# Patient Record
Sex: MALE | Race: WHITE
[De-identification: names, ages, dates, MRNs, and addresses within clinical notes are randomized per-mention and may not be internally consistent; named-entity substitution may affect disease eponyms.]

---

## 2018-05-29 ENCOUNTER — HOSPITAL ENCOUNTER (INPATIENT)
Dept: HOSPITAL 93 - ER | Age: 83
LOS: 3 days | Discharge: HOME | DRG: 684 | End: 2018-06-01
Attending: INTERNAL MEDICINE | Admitting: INTERNAL MEDICINE
Payer: COMMERCIAL

## 2018-05-29 VITALS — BODY MASS INDEX: 29.07 KG/M2 | HEIGHT: 61 IN | WEIGHT: 154 LBS

## 2018-05-29 DIAGNOSIS — I70.292: ICD-10-CM

## 2018-05-29 DIAGNOSIS — R97.20: ICD-10-CM

## 2018-05-29 DIAGNOSIS — J22: ICD-10-CM

## 2018-05-29 DIAGNOSIS — I13.10: ICD-10-CM

## 2018-05-29 DIAGNOSIS — E11.3293: ICD-10-CM

## 2018-05-29 DIAGNOSIS — E11.42: ICD-10-CM

## 2018-05-29 DIAGNOSIS — N18.1: ICD-10-CM

## 2018-05-29 DIAGNOSIS — E11.65: ICD-10-CM

## 2018-05-29 DIAGNOSIS — E11.22: ICD-10-CM

## 2018-05-29 DIAGNOSIS — E86.0: ICD-10-CM

## 2018-05-29 DIAGNOSIS — E78.4: ICD-10-CM

## 2018-05-29 DIAGNOSIS — N17.8: Primary | ICD-10-CM

## 2018-05-30 PROCEDURE — BW40ZZZ ULTRASONOGRAPHY OF ABDOMEN: ICD-10-PCS | Performed by: INTERNAL MEDICINE

## 2018-05-30 PROCEDURE — 3E0F7GC INTRODUCTION OF OTHER THERAPEUTIC SUBSTANCE INTO RESPIRATORY TRACT, VIA NATURAL OR ARTIFICIAL OPENING: ICD-10-PCS | Performed by: INTERNAL MEDICINE

## 2018-06-07 ENCOUNTER — HOSPITAL ENCOUNTER (INPATIENT)
Dept: HOSPITAL 93 - ER | Age: 83
LOS: 4 days | Discharge: HOME | DRG: 378 | End: 2018-06-11
Attending: INTERNAL MEDICINE | Admitting: INTERNAL MEDICINE
Payer: COMMERCIAL

## 2018-06-07 VITALS — BODY MASS INDEX: 29.44 KG/M2 | WEIGHT: 160 LBS | HEIGHT: 62 IN

## 2018-06-07 DIAGNOSIS — E11.3293: ICD-10-CM

## 2018-06-07 DIAGNOSIS — E11.42: ICD-10-CM

## 2018-06-07 DIAGNOSIS — K29.60: ICD-10-CM

## 2018-06-07 DIAGNOSIS — E78.4: ICD-10-CM

## 2018-06-07 DIAGNOSIS — D50.0: ICD-10-CM

## 2018-06-07 DIAGNOSIS — I70.293: ICD-10-CM

## 2018-06-07 DIAGNOSIS — I11.9: ICD-10-CM

## 2018-06-07 DIAGNOSIS — K92.1: Primary | ICD-10-CM

## 2018-06-07 DIAGNOSIS — K22.10: ICD-10-CM

## 2018-06-07 PROCEDURE — 30233N1 TRANSFUSION OF NONAUTOLOGOUS RED BLOOD CELLS INTO PERIPHERAL VEIN, PERCUTANEOUS APPROACH: ICD-10-PCS | Performed by: INTERNAL MEDICINE

## 2018-06-07 PROCEDURE — BW21Y0Z COMPUTERIZED TOMOGRAPHY (CT SCAN) OF ABDOMEN AND PELVIS USING OTHER CONTRAST, UNENHANCED AND ENHANCED: ICD-10-PCS | Performed by: INTERNAL MEDICINE

## 2018-06-08 PROCEDURE — 0DB78ZX EXCISION OF STOMACH, PYLORUS, VIA NATURAL OR ARTIFICIAL OPENING ENDOSCOPIC, DIAGNOSTIC: ICD-10-PCS | Performed by: INTERNAL MEDICINE

## 2018-06-09 PROCEDURE — 4A12X4Z MONITORING OF CARDIAC ELECTRICAL ACTIVITY, EXTERNAL APPROACH: ICD-10-PCS | Performed by: INTERNAL MEDICINE

## 2019-08-24 ENCOUNTER — HOSPITAL ENCOUNTER (EMERGENCY)
Dept: HOSPITAL 93 - ER | Age: 84
Discharge: HOME | End: 2019-08-24
Payer: COMMERCIAL

## 2019-08-24 VITALS — WEIGHT: 150 LBS | HEIGHT: 62 IN | BODY MASS INDEX: 27.6 KG/M2

## 2019-08-24 DIAGNOSIS — L03.116: ICD-10-CM

## 2019-08-24 DIAGNOSIS — M79.662: ICD-10-CM
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
## 2019-08-24 DIAGNOSIS — S80.12XA: Primary | ICD-10-CM
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
## 2019-09-16 ENCOUNTER — HOSPITAL ENCOUNTER (INPATIENT)
Dept: HOSPITAL 93 - ER | Age: 84
LOS: 23 days | Discharge: HOME | DRG: 255 | End: 2019-10-09
Attending: SPECIALIST | Admitting: SPECIALIST
Payer: COMMERCIAL

## 2019-09-16 VITALS — WEIGHT: 152 LBS | BODY MASS INDEX: 27.97 KG/M2 | HEIGHT: 62 IN

## 2019-09-16 DIAGNOSIS — I87.2: ICD-10-CM

## 2019-09-16 DIAGNOSIS — D63.8: ICD-10-CM

## 2019-09-16 DIAGNOSIS — E11.42: ICD-10-CM

## 2019-09-16 DIAGNOSIS — K21.9: ICD-10-CM

## 2019-09-16 DIAGNOSIS — J91.8: ICD-10-CM

## 2019-09-16 DIAGNOSIS — J98.11: ICD-10-CM

## 2019-09-16 DIAGNOSIS — I31.3: ICD-10-CM
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
## 2019-09-16 DIAGNOSIS — E11.22: ICD-10-CM

## 2019-09-16 DIAGNOSIS — E11.319: ICD-10-CM

## 2019-09-16 DIAGNOSIS — M10.071: ICD-10-CM

## 2019-09-16 DIAGNOSIS — L02.611: ICD-10-CM

## 2019-09-16 DIAGNOSIS — B96.5: ICD-10-CM

## 2019-09-16 DIAGNOSIS — R09.02: ICD-10-CM

## 2019-09-16 DIAGNOSIS — I96: ICD-10-CM

## 2019-09-16 DIAGNOSIS — E11.65: ICD-10-CM

## 2019-09-16 DIAGNOSIS — N17.8: ICD-10-CM

## 2019-09-16 DIAGNOSIS — G93.41: ICD-10-CM

## 2019-09-16 DIAGNOSIS — J95.89: ICD-10-CM

## 2019-09-16 DIAGNOSIS — I13.10: ICD-10-CM

## 2019-09-16 DIAGNOSIS — E86.0: ICD-10-CM

## 2019-09-16 DIAGNOSIS — I34.0: ICD-10-CM

## 2019-09-16 DIAGNOSIS — B95.2: ICD-10-CM

## 2019-09-16 DIAGNOSIS — B96.1: ICD-10-CM

## 2019-09-16 DIAGNOSIS — L03.116: ICD-10-CM

## 2019-09-16 DIAGNOSIS — F03.90: ICD-10-CM

## 2019-09-16 DIAGNOSIS — E11.52: Primary | ICD-10-CM

## 2019-09-16 DIAGNOSIS — T36.8X5A: ICD-10-CM

## 2019-09-16 DIAGNOSIS — N18.3: ICD-10-CM

## 2019-09-16 DIAGNOSIS — Z89.411: ICD-10-CM

## 2019-09-16 DIAGNOSIS — K52.1: ICD-10-CM

## 2019-09-16 NOTE — NUR
[facility] GARCIA ORIENTA A PT SOBRE ORDENES MEDICAS EL CUAL REFIERE ENTENDER. LE
COLECTA MUESTRAS Y LO CANALIZA BAJO MEDIDAS ASEPTICAS PT TOLERA. SE COLECTA
CULTIVOS DE MARILEE Y CULTIVO DE ULCERA. LE REALIZA EKG Y LO PRESENTA A DR WOODARD
QUIEN EVALUA EL MISMO.

## 2019-09-16 NOTE — NUR
REFERIDA POR DRA ALLY FOURNIER POR CELULITIS EN PIES DERECHO (DEDO FELIPE).
ALERTA Y ORIENTADO X 3, ACOMPANADO POR COLLAZO ESPOSA, SE UBICA EN AREA DE
OBSERVACION.

## 2019-09-26 PROCEDURE — 0Y6P0Z0 DETACHMENT AT RIGHT 1ST TOE, COMPLETE, OPEN APPROACH: ICD-10-PCS | Performed by: SURGERY

## 2019-09-26 PROCEDURE — B44HZZZ ULTRASONOGRAPHY OF BILATERAL LOWER EXTREMITY ARTERIES: ICD-10-PCS | Performed by: SPECIALIST

## 2019-09-26 PROCEDURE — 30233N1 TRANSFUSION OF NONAUTOLOGOUS RED BLOOD CELLS INTO PERIPHERAL VEIN, PERCUTANEOUS APPROACH: ICD-10-PCS | Performed by: SPECIALIST

## 2019-10-01 PROCEDURE — 3E0F7GC INTRODUCTION OF OTHER THERAPEUTIC SUBSTANCE INTO RESPIRATORY TRACT, VIA NATURAL OR ARTIFICIAL OPENING: ICD-10-PCS | Performed by: INTERNAL MEDICINE

## 2019-10-01 PROCEDURE — 4A033R1 MEASUREMENT OF ARTERIAL SATURATION, PERIPHERAL, PERCUTANEOUS APPROACH: ICD-10-PCS | Performed by: INTERNAL MEDICINE

## 2019-10-03 PROCEDURE — B54DZZZ ULTRASONOGRAPHY OF BILATERAL LOWER EXTREMITY VEINS: ICD-10-PCS | Performed by: INTERNAL MEDICINE

## 2019-10-03 PROCEDURE — B246ZZZ ULTRASONOGRAPHY OF RIGHT AND LEFT HEART: ICD-10-PCS | Performed by: SPECIALIST

## 2019-10-03 PROCEDURE — BB24ZZZ COMPUTERIZED TOMOGRAPHY (CT SCAN) OF BILATERAL LUNGS: ICD-10-PCS | Performed by: INTERNAL MEDICINE

## 2019-10-06 PROCEDURE — B44FZZZ ULTRASONOGRAPHY OF RIGHT LOWER EXTREMITY ARTERIES: ICD-10-PCS | Performed by: INTERNAL MEDICINE
